# Patient Record
Sex: MALE | Race: WHITE | URBAN - METROPOLITAN AREA
[De-identification: names, ages, dates, MRNs, and addresses within clinical notes are randomized per-mention and may not be internally consistent; named-entity substitution may affect disease eponyms.]

---

## 2023-04-06 ENCOUNTER — OFFICE VISIT (OUTPATIENT)
Dept: URBAN - METROPOLITAN AREA CLINIC 24 | Facility: CLINIC | Age: 78
End: 2023-04-06
Payer: MEDICARE

## 2023-04-06 DIAGNOSIS — H52.223 REGULAR ASTIGMATISM, BILATERAL: ICD-10-CM

## 2023-04-06 DIAGNOSIS — Z86.73 PERSONAL HISTORY OF CEREBRAL INFARCTION W/O RESIDUAL DEFICITS: Primary | ICD-10-CM

## 2023-04-06 DIAGNOSIS — H25.13 AGE-RELATED NUCLEAR CATARACT, BILATERAL: ICD-10-CM

## 2023-04-06 PROCEDURE — 92083 EXTENDED VISUAL FIELD XM: CPT | Performed by: STUDENT IN AN ORGANIZED HEALTH CARE EDUCATION/TRAINING PROGRAM

## 2023-04-06 PROCEDURE — 99203 OFFICE O/P NEW LOW 30 MIN: CPT | Performed by: STUDENT IN AN ORGANIZED HEALTH CARE EDUCATION/TRAINING PROGRAM

## 2023-04-06 ASSESSMENT — INTRAOCULAR PRESSURE
OD: 16
OS: 17

## 2023-04-06 ASSESSMENT — VISUAL ACUITY
OD: 20/20
OS: 20/20

## 2023-04-06 NOTE — IMPRESSION/PLAN
Impression: Personal history of cerebral infarction w/o residual deficits: Z86.73.
-- Per patient dx with a CVA x 4/27/22
-- VA today unaffected, BCVA 20/20
-- HVF 30-2 essentially full with edge defects Plan: Pet ed may be new shift in vision but no additional tx or testing recommend with us at this time.  Cont care with PCP and other providers

## 2023-04-06 NOTE — IMPRESSION/PLAN
Impression: Regular astigmatism, bilateral: H52.223. Plan: Cause of reduced vision, hold rx today. 
Pt to return with vision insurance for updated refraction, if pt does not want to update glasses with vision insurance recommend return for repeat refraction to adjust for high cyl d/t concerns with adaptation